# Patient Record
Sex: FEMALE | Race: WHITE | Employment: FULL TIME | URBAN - METROPOLITAN AREA
[De-identification: names, ages, dates, MRNs, and addresses within clinical notes are randomized per-mention and may not be internally consistent; named-entity substitution may affect disease eponyms.]

---

## 2017-05-30 ENCOUNTER — HOSPITAL ENCOUNTER (OUTPATIENT)
Age: 52
Discharge: HOME | End: 2017-05-30
Attending: EMERGENCY MEDICINE
Payer: COMMERCIAL

## 2017-05-30 VITALS
RESPIRATION RATE: 16 BRPM | SYSTOLIC BLOOD PRESSURE: 130 MMHG | OXYGEN SATURATION: 98 % | BODY MASS INDEX: 25.83 KG/M2 | HEIGHT: 65 IN | DIASTOLIC BLOOD PRESSURE: 85 MMHG | HEART RATE: 69 BPM | TEMPERATURE: 98 F | WEIGHT: 155 LBS

## 2017-05-30 DIAGNOSIS — J01.00 ACUTE NON-RECURRENT MAXILLARY SINUSITIS: Primary | ICD-10-CM

## 2017-05-30 PROCEDURE — 99204 OFFICE O/P NEW MOD 45 MIN: CPT

## 2017-05-30 PROCEDURE — 87430 STREP A AG IA: CPT

## 2017-05-30 PROCEDURE — 99203 OFFICE O/P NEW LOW 30 MIN: CPT

## 2017-05-30 RX ORDER — SULFAMETHOXAZOLE AND TRIMETHOPRIM 800; 160 MG/1; MG/1
1 TABLET ORAL 2 TIMES DAILY
Qty: 20 TABLET | Refills: 0 | Status: SHIPPED | OUTPATIENT
Start: 2017-05-30 | End: 2017-06-09

## 2017-05-30 RX ORDER — METOPROLOL SUCCINATE 25 MG/1
25 TABLET, EXTENDED RELEASE ORAL DAILY
COMMUNITY

## 2017-05-30 NOTE — ED PROVIDER NOTES
Patient Seen in: [address]    History   Patient presents with:  Sore Throat    Stated Complaint: Sore Throat    HPI  Patient is a 49-year-old female who presents to immediate care complaining of mild scratchy sore throa well-nourished. HENT:   Head: Normocephalic and atraumatic. Right Ear: External ear normal.   Left Ear: External ear normal.   Mouth/Throat: Oropharynx is clear and moist. No oropharyngeal exudate.    Eyes: Conjunctivae and EOM are normal. Pupils are eq

## 2017-05-30 NOTE — ED INITIAL ASSESSMENT (HPI)
Patient stats having sore throat, sinus pain/pressure, headaches and ear pressure, loss of appetite and intermittent nausea since Saturday. Patient denies fever, cough, vomiting.

## 2017-06-09 ENCOUNTER — HOSPITAL ENCOUNTER (OUTPATIENT)
Age: 52
Discharge: HOME | End: 2017-06-09
Attending: EMERGENCY MEDICINE
Payer: COMMERCIAL

## 2017-06-09 VITALS
HEIGHT: 65 IN | TEMPERATURE: 98 F | RESPIRATION RATE: 16 BRPM | DIASTOLIC BLOOD PRESSURE: 78 MMHG | OXYGEN SATURATION: 99 % | SYSTOLIC BLOOD PRESSURE: 120 MMHG | HEART RATE: 71 BPM | BODY MASS INDEX: 25.66 KG/M2 | WEIGHT: 154 LBS

## 2017-06-09 DIAGNOSIS — L27.0 DRUG RASH: Primary | ICD-10-CM

## 2017-06-09 PROCEDURE — 99213 OFFICE O/P EST LOW 20 MIN: CPT

## 2017-06-09 PROCEDURE — 99214 OFFICE O/P EST MOD 30 MIN: CPT

## 2017-06-09 PROCEDURE — 87430 STREP A AG IA: CPT

## 2017-06-09 RX ORDER — PREDNISONE 20 MG/1
40 TABLET ORAL DAILY
Qty: 6 TABLET | Refills: 0 | Status: SHIPPED | OUTPATIENT
Start: 2017-06-09 | End: 2017-06-12

## 2017-06-09 RX ORDER — PREDNISONE 20 MG/1
40 TABLET ORAL DAILY
Qty: 6 TABLET | Refills: 0 | Status: SHIPPED | OUTPATIENT
Start: 2017-06-09 | End: 2017-06-09

## 2017-06-09 NOTE — ED PROVIDER NOTES
Patient Seen in: [address]    History   Patient presents with:  Rash Skin Problem (integumentary)    Stated Complaint: congested, rash all over body    HPI    64-year-old female presents for evaluation of rash.   Patient appears well-developed and well-nourished. No distress. HENT:   Head: Normocephalic and atraumatic.    Mouth/Throat: Oropharynx is clear and moist.   No oral lesions or vesicles   Eyes: Conjunctivae are normal. Pupils are equal, round, and reactive to lig

## 2017-06-09 NOTE — ED INITIAL ASSESSMENT (HPI)
Pt presents to the IC with c/o rash to entire body and a fever s/p being on Bactrim for 10 days. AOx4. Pt notes nasal congestion that did not improve with the antibiotic. Pt took Zyrtec with minimal improvement.

## 2017-10-12 ENCOUNTER — HOSPITAL ENCOUNTER (OUTPATIENT)
Age: 52
Discharge: HOME | End: 2017-10-12
Attending: PEDIATRICS
Payer: COMMERCIAL

## 2017-10-12 VITALS
SYSTOLIC BLOOD PRESSURE: 137 MMHG | DIASTOLIC BLOOD PRESSURE: 92 MMHG | TEMPERATURE: 98 F | HEIGHT: 65 IN | OXYGEN SATURATION: 100 % | RESPIRATION RATE: 19 BRPM | WEIGHT: 130 LBS | BODY MASS INDEX: 21.66 KG/M2 | HEART RATE: 58 BPM

## 2017-10-12 DIAGNOSIS — J01.90 ACUTE SINUSITIS, RECURRENCE NOT SPECIFIED, UNSPECIFIED LOCATION: Primary | ICD-10-CM

## 2017-10-12 PROCEDURE — 99213 OFFICE O/P EST LOW 20 MIN: CPT

## 2017-10-12 RX ORDER — AMOXICILLIN AND CLAVULANATE POTASSIUM 875; 125 MG/1; MG/1
1 TABLET, FILM COATED ORAL 2 TIMES DAILY
Qty: 20 TABLET | Refills: 0 | Status: SHIPPED | OUTPATIENT
Start: 2017-10-12 | End: 2017-10-22

## 2017-10-12 NOTE — ED PROVIDER NOTES
Patient presents with:  Sinusitis      HPI:     Marychuy Lopez is a 45year old female who presents for evaluation of a chief complaint of upper respiratory symptoms for approximately 2 weeks. She denies fevers, chest pain, shortness of breath.   She notes c

## 2017-10-12 NOTE — ED INITIAL ASSESSMENT (HPI)
Sinus congestion. Pt attempted to use saline to clear sinuses but had green mucous and now unable to clear sinuses.    +discomfort behind eyes and left side of face

## 2020-01-03 ENCOUNTER — HOSPITAL ENCOUNTER (OUTPATIENT)
Age: 55
Discharge: HOME | End: 2020-01-03
Attending: EMERGENCY MEDICINE
Payer: COMMERCIAL

## 2020-01-03 VITALS
TEMPERATURE: 98 F | WEIGHT: 160 LBS | HEIGHT: 64 IN | SYSTOLIC BLOOD PRESSURE: 149 MMHG | HEART RATE: 64 BPM | RESPIRATION RATE: 16 BRPM | DIASTOLIC BLOOD PRESSURE: 87 MMHG | OXYGEN SATURATION: 100 % | BODY MASS INDEX: 27.31 KG/M2

## 2020-01-03 DIAGNOSIS — J06.9 UPPER RESPIRATORY TRACT INFECTION, UNSPECIFIED TYPE: Primary | ICD-10-CM

## 2020-01-03 PROCEDURE — 99213 OFFICE O/P EST LOW 20 MIN: CPT

## 2020-01-03 PROCEDURE — 99214 OFFICE O/P EST MOD 30 MIN: CPT

## 2020-01-03 RX ORDER — AZITHROMYCIN 250 MG/1
TABLET, FILM COATED ORAL
Qty: 1 PACKAGE | Refills: 0 | Status: SHIPPED | OUTPATIENT
Start: 2020-01-03 | End: 2020-01-08

## 2020-01-03 RX ORDER — NARATRIPTAN 2.5 MG/1
TABLET ORAL
COMMUNITY
Start: 2019-02-19

## 2020-01-03 RX ORDER — HYDROCHLOROTHIAZIDE 25 MG/1
TABLET ORAL
COMMUNITY
Start: 2019-10-14 | End: 2020-07-07

## 2020-01-03 NOTE — ED PROVIDER NOTES
Patient Seen in: [address]    History   Patient presents with:  Cough/URI    Stated Complaint: cold symptoms    HPI    Patient is here with complaint of cough congestion for about a week.   She is not really having a sor 27.46 kg/m²         Physical Exam  Constitutional:  Alert, well nourished adult lying in bed in no distress. Vital signs noted. Eye:  No scleral icterus. Eyelids appear normal, no lesions.   HEENT: Tympanic membranes are redness no bulging oropharynx is

## 2020-07-07 ENCOUNTER — OFFICE VISIT (OUTPATIENT)
Dept: INTEGRATIVE MEDICINE | Facility: CLINIC | Age: 55
End: 2020-07-07
Payer: COMMERCIAL

## 2020-07-07 VITALS
DIASTOLIC BLOOD PRESSURE: 70 MMHG | WEIGHT: 157.38 LBS | OXYGEN SATURATION: 98 % | BODY MASS INDEX: 26.87 KG/M2 | HEART RATE: 70 BPM | HEIGHT: 64 IN | SYSTOLIC BLOOD PRESSURE: 122 MMHG

## 2020-07-07 DIAGNOSIS — K90.41 GLUTEN INTOLERANCE: ICD-10-CM

## 2020-07-07 DIAGNOSIS — M81.8 AGE-RELATED OSTEOPOROSIS WITHOUT FRACTURE: Primary | ICD-10-CM

## 2020-07-07 DIAGNOSIS — I10 ESSENTIAL HYPERTENSION, BENIGN: ICD-10-CM

## 2020-07-07 DIAGNOSIS — G47.33 OSA (OBSTRUCTIVE SLEEP APNEA): ICD-10-CM

## 2020-07-07 PROBLEM — Z87.19 HISTORY OF ESOPHAGITIS: Status: ACTIVE | Noted: 2019-11-16

## 2020-07-07 PROBLEM — M54.2 CERVICALGIA: Status: ACTIVE | Noted: 2019-02-19

## 2020-07-07 PROCEDURE — 99204 OFFICE O/P NEW MOD 45 MIN: CPT | Performed by: FAMILY MEDICINE

## 2020-07-07 RX ORDER — AMLODIPINE BESYLATE 5 MG/1
5 TABLET ORAL DAILY
COMMUNITY
End: 2021-03-24

## 2020-07-07 RX ORDER — GALCANEZUMAB 120 MG/ML
INJECTION, SOLUTION SUBCUTANEOUS
COMMUNITY
Start: 2020-06-08

## 2020-07-07 NOTE — PROGRESS NOTES
Oksana Thomas is a 60year old female. Patient presents with: Integrative Medicine Consult: Questions about infusion injections for osteoporosis      HPI:     Broke her Rt wrist several years ago after falling.    Then cracked a rib while wrestling with Medication Sig Dispense Refill   • amLODIPine Besylate 5 MG Oral Tab Take 5 mg by mouth daily. • EMGALITY 120 MG/ML Subcutaneous Solution Auto-injector ADM 1 ML SC Q 30 DAYS     • Flurbiprofen 100 MG Oral Tab Take 100 mg by mouth as needed.      • Danuta SURGICAL HISTORY:   No past surgical history on file.     PHYSICAL EXAM:      07/07/20  0821   BP: 122/70   Pulse: 70   SpO2: 98%   Weight: 157 lb 6.4 oz (71.4 kg)   Height: 64\"       Physical Exam   Constitutional: She is oriented to person, place, and ti The products and items listed below (the “Products”)  and their claims have not been evaluated by the Food and Drug Administration. Dietary products are not intended to treat, prevent, mitigate or cure disease.  Ultimately, you must draw your own conclusion - Sit quietly before and during monitoring. When you're ready to take your blood pressure, sit for five minutes in a comfortable position with your legs and ankles uncrossed and your back supported against a chair.  Try to be calm and not think about stress Return in about 2 months (around 9/7/2020) for Integrative Medicine - Established (30 min). Patient affirmed understanding of plan and all questions were answered.      Ramiro Nash, DO

## 2020-07-07 NOTE — PATIENT INSTRUCTIONS
I have complete frankie in the body's ability to heal and transform. The products and items listed below (the “Products”)  and their claims have not been evaluated by the Food and Drug Administration.  Dietary products are not intended to treat, prevent, m increase blood pressure slightly. - Sit quietly before and during monitoring. When you're ready to take your blood pressure, sit for five minutes in a comfortable position with your legs and ankles uncrossed and your back supported against a chair.  Try to to Find: www. [employer]. com

## 2020-08-04 ENCOUNTER — OFFICE VISIT (OUTPATIENT)
Dept: SLEEP CENTER | Age: 55
End: 2020-08-04
Attending: FAMILY MEDICINE
Payer: COMMERCIAL

## 2020-08-04 DIAGNOSIS — G47.33 OSA (OBSTRUCTIVE SLEEP APNEA): Primary | ICD-10-CM

## 2020-08-04 PROCEDURE — 95806 SLEEP STUDY UNATT&RESP EFFT: CPT

## 2020-08-11 NOTE — PROCEDURES
[facility]  Accredited by the [place] of [employer] Medicine (AASM)    PATIENT'S NAME: Syeda Berman   ATTENDING PHYSICIAN: Tiffanie Daigle. DO Conner   REFERRING PHYSICIAN: [address] [place] [address]  DO Conner   PATIENT ACCOUNT #: [de-identified] LOCATION: Sleep 19:55:18  t: 08/10/2020 20:58:08  [facility] [id number]  JDP/    cc: Thomas Zavala.  DO Conner

## 2020-09-09 ENCOUNTER — OFFICE VISIT (OUTPATIENT)
Dept: INTEGRATIVE MEDICINE | Facility: CLINIC | Age: 55
End: 2020-09-09
Payer: COMMERCIAL

## 2020-09-09 VITALS
OXYGEN SATURATION: 99 % | BODY MASS INDEX: 26.8 KG/M2 | SYSTOLIC BLOOD PRESSURE: 132 MMHG | HEART RATE: 64 BPM | HEIGHT: 64 IN | DIASTOLIC BLOOD PRESSURE: 86 MMHG | WEIGHT: 157 LBS

## 2020-09-09 DIAGNOSIS — M81.8 AGE-RELATED OSTEOPOROSIS WITHOUT FRACTURE: Primary | ICD-10-CM

## 2020-09-09 DIAGNOSIS — I10 ESSENTIAL HYPERTENSION, BENIGN: ICD-10-CM

## 2020-09-09 DIAGNOSIS — E66.3 OVERWEIGHT (BMI 25.0-29.9): ICD-10-CM

## 2020-09-09 DIAGNOSIS — G47.33 OSA (OBSTRUCTIVE SLEEP APNEA): ICD-10-CM

## 2020-09-09 DIAGNOSIS — K90.41 GLUTEN INTOLERANCE: ICD-10-CM

## 2020-09-09 DIAGNOSIS — G47.10 HYPERSOMNIA: ICD-10-CM

## 2020-09-09 PROCEDURE — 3079F DIAST BP 80-89 MM HG: CPT | Performed by: FAMILY MEDICINE

## 2020-09-09 PROCEDURE — 99214 OFFICE O/P EST MOD 30 MIN: CPT | Performed by: FAMILY MEDICINE

## 2020-09-09 PROCEDURE — 3008F BODY MASS INDEX DOCD: CPT | Performed by: FAMILY MEDICINE

## 2020-09-09 PROCEDURE — 3075F SYST BP GE 130 - 139MM HG: CPT | Performed by: FAMILY MEDICINE

## 2020-09-09 NOTE — PATIENT INSTRUCTIONS
I have complete frankie in the body's ability to heal and transform. The products and items listed below (the “Products”)  and their claims have not been evaluated by the Food and Drug Administration.  Dietary products are not intended to treat, prevent, m A liquid supplement for gut health. This is a carbon, soil-based product that helps to rebuild the tight junctions, or important connections between cells, in the intestines.  These tight junctions get compromised by daily stress, reduced immune function an

## 2020-09-09 NOTE — PROGRESS NOTES
Chrissie Alford is a 60year old female. Patient presents with: Integrative Medicine Consult: 2 mos      HPI:     Working on diet and exercise. Went gluten free and then cheated last week and got tired for several days from this.    Feeling better after Marital status:       Spouse name: Not on file      Number of children: Not on file      Years of education: Not on file      Highest education level: Not on file    Occupational History      Not on file    Social Needs      Financial resource s Musculoskeletal:         General: No edema. Neurological: She is alert and oriented to person, place, and time. No cranial nerve deficit. Gait normal.   Skin: Skin is warm and dry.    Psychiatric: Affect normal.       ASSESSMENT AND PLAN:       1. Age-rel The products and items listed below (the “Products”)  and their claims have not been evaluated by the Food and Drug Administration. Dietary products are not intended to treat, prevent, mitigate or cure disease.  Ultimately, you must draw your own conclusion A liquid supplement for gut health. This is a carbon, soil-based product that helps to rebuild the tight junctions, or important connections between cells, in the intestines.  These tight junctions get compromised by daily stress, reduced immune function an

## 2020-10-15 ENCOUNTER — OFFICE VISIT (OUTPATIENT)
Dept: SLEEP CENTER | Age: 55
End: 2020-10-15
Attending: FAMILY MEDICINE
Payer: COMMERCIAL

## 2020-10-15 DIAGNOSIS — Z76.89 SLEEP CONCERN: Primary | ICD-10-CM

## 2020-10-15 DIAGNOSIS — M81.8 AGE-RELATED OSTEOPOROSIS WITHOUT FRACTURE: ICD-10-CM

## 2020-10-15 DIAGNOSIS — G47.33 OSA (OBSTRUCTIVE SLEEP APNEA): ICD-10-CM

## 2020-10-15 DIAGNOSIS — G47.10 HYPERSOMNIA: ICD-10-CM

## 2020-10-15 DIAGNOSIS — E66.3 OVERWEIGHT (BMI 25.0-29.9): ICD-10-CM

## 2020-10-15 DIAGNOSIS — I10 ESSENTIAL HYPERTENSION, BENIGN: ICD-10-CM

## 2020-10-15 PROCEDURE — 95810 POLYSOM 6/> YRS 4/> PARAM: CPT

## 2020-10-25 NOTE — PROCEDURES
[address] [facility]  Accredited by the [employer] of Sleep Medicine (AASM)    PATIENT'S NAME: Marshal Cardona   ATTENDING PHYSICIAN: Davon Hickman. DO Conner   REFERRING PHYSICIAN: [address] [place] [address]  DO Conner   PATIENT ACCOUNT #: [de-identified] LOCATION: Sleep Periodic limb movements were not observed. EEG:  With the limited montage recorded, no EEG abnormalities were observed. IMPRESSION:  This study demonstrates primary snoring.   Sleep-disordered breathing was observed but it was most prominent during s

## 2020-11-14 ENCOUNTER — HOSPITAL ENCOUNTER (OUTPATIENT)
Age: 55
Discharge: HOME | End: 2020-11-14
Payer: COMMERCIAL

## 2020-11-14 VITALS
TEMPERATURE: 98 F | RESPIRATION RATE: 16 BRPM | BODY MASS INDEX: 26.46 KG/M2 | OXYGEN SATURATION: 98 % | DIASTOLIC BLOOD PRESSURE: 81 MMHG | SYSTOLIC BLOOD PRESSURE: 122 MMHG | WEIGHT: 155 LBS | HEIGHT: 64 IN | HEART RATE: 67 BPM

## 2020-11-14 DIAGNOSIS — G44.89 OTHER HEADACHE SYNDROME: ICD-10-CM

## 2020-11-14 DIAGNOSIS — Z20.822 ENCOUNTER FOR SCREENING LABORATORY TESTING FOR COVID-19 VIRUS: Primary | ICD-10-CM

## 2020-11-14 DIAGNOSIS — R09.81 CONGESTION OF NASAL SINUS: ICD-10-CM

## 2020-11-14 PROCEDURE — 99202 OFFICE O/P NEW SF 15 MIN: CPT | Performed by: EMERGENCY MEDICINE

## 2020-11-14 NOTE — ED PROVIDER NOTES
Patient Seen in: [facility]      History   Patient presents with:  Testing  Headache    Stated Complaint: cris Linn is a 60year old  female here for Covid testing after symptoms of headache, and nasal congestion.  Medical Normocephalic. Right Ear: External ear normal.      Left Ear: External ear normal.      Nose: Congestion present. Mouth/Throat:      Mouth: Mucous membranes are moist.      Pharynx: No oropharyngeal exudate.    Eyes:      Conjunctiva/sclera: Conju and plan, who verbalized understanding and agreement with the plan. All questions answered. Any uncontrolled Sob, uncontrolled fever, or CP go to ER for further eval.  Otherwise treat your symptoms at home.     I have given the patient instructions regardin

## 2020-11-14 NOTE — ED INITIAL ASSESSMENT (HPI)
Pt c/o headache and stuffy nose x3 days. No fever. No known covid exposure. Awake/alert. Breathing easy and even without distress. Speech clear. Skin warm, dry and pink.

## 2020-12-09 ENCOUNTER — OFFICE VISIT (OUTPATIENT)
Dept: INTEGRATIVE MEDICINE | Facility: CLINIC | Age: 55
End: 2020-12-09
Payer: COMMERCIAL

## 2020-12-09 VITALS
WEIGHT: 159 LBS | HEART RATE: 58 BPM | HEIGHT: 64 IN | BODY MASS INDEX: 27.14 KG/M2 | DIASTOLIC BLOOD PRESSURE: 86 MMHG | OXYGEN SATURATION: 98 % | SYSTOLIC BLOOD PRESSURE: 116 MMHG

## 2020-12-09 DIAGNOSIS — K90.41 GLUTEN INTOLERANCE: ICD-10-CM

## 2020-12-09 DIAGNOSIS — E66.3 OVERWEIGHT (BMI 25.0-29.9): ICD-10-CM

## 2020-12-09 DIAGNOSIS — I10 ESSENTIAL HYPERTENSION, BENIGN: ICD-10-CM

## 2020-12-09 DIAGNOSIS — G47.33 OSA (OBSTRUCTIVE SLEEP APNEA): ICD-10-CM

## 2020-12-09 DIAGNOSIS — Z00.00 ROUTINE MEDICAL EXAM: Primary | ICD-10-CM

## 2020-12-09 DIAGNOSIS — M81.8 AGE-RELATED OSTEOPOROSIS WITHOUT FRACTURE: ICD-10-CM

## 2020-12-09 PROBLEM — R20.0 LEFT ARM NUMBNESS: Status: ACTIVE | Noted: 2019-02-19

## 2020-12-09 PROBLEM — H83.2X9 VESTIBULAR DYSFUNCTION: Status: ACTIVE | Noted: 2019-02-19

## 2020-12-09 PROBLEM — Z12.11 SPECIAL SCREENING FOR MALIGNANT NEOPLASMS, COLON: Status: ACTIVE | Noted: 2018-12-27

## 2020-12-09 PROBLEM — H81.90 VESTIBULAR DYSFUNCTION: Status: ACTIVE | Noted: 2019-02-19

## 2020-12-09 PROBLEM — G43.109 MIGRAINE WITH AURA AND WITHOUT STATUS MIGRAINOSUS, NOT INTRACTABLE: Status: ACTIVE | Noted: 2019-02-19

## 2020-12-09 PROCEDURE — 99396 PREV VISIT EST AGE 40-64: CPT | Performed by: FAMILY MEDICINE

## 2020-12-09 PROCEDURE — 90686 IIV4 VACC NO PRSV 0.5 ML IM: CPT | Performed by: FAMILY MEDICINE

## 2020-12-09 PROCEDURE — 90471 IMMUNIZATION ADMIN: CPT | Performed by: FAMILY MEDICINE

## 2020-12-09 PROCEDURE — 3008F BODY MASS INDEX DOCD: CPT | Performed by: FAMILY MEDICINE

## 2020-12-09 PROCEDURE — 3074F SYST BP LT 130 MM HG: CPT | Performed by: FAMILY MEDICINE

## 2020-12-09 PROCEDURE — 3079F DIAST BP 80-89 MM HG: CPT | Performed by: FAMILY MEDICINE

## 2020-12-09 NOTE — PATIENT INSTRUCTIONS
I have complete frankie in the body's ability to heal and transform. The products and items listed below (the “Products”)  and their claims have not been evaluated by the Food and Drug Administration.  Dietary products are not intended to treat, prevent, m - Don't measure your blood pressure right after you wake up. You can prepare for the day, but don't eat breakfast or take medications before measuring your blood pressure.  If you exercise after waking, take your blood pressure before exercising.  - Avoid f

## 2020-12-09 NOTE — PROGRESS NOTES
Leonard Liu is a 60year old female. Patient presents with:  Physical      HPI:     Cut out most sugar, using coconut sugar instead. Avoiding fried foods. By limiting her water she is waking up less in the night to urinate.    Has been on BP meds SOCIAL HISTORY:   Social History    Socioeconomic History      Marital status:       Spouse name: Not on file      Number of children: Not on file      Years of education: Not on file      Highest education level: Not on file    Occupational History Nose: Nose normal.   Mouth/Throat: Oropharynx is clear and moist. No oropharyngeal exudate. Eyes: Pupils are equal, round, and reactive to light. Conjunctivae and EOM are normal. No scleral icterus. Neck: Normal range of motion. Neck supple.  No thyrome Vitamin D, 25-Hydroxy [E]      TSH W Reflex To Free T4 [E]      Magnesium [E]      Lipid Panel [E]      Hemoglobin A1C (Glycohemoglobin) [E]      Homocysteine [E]      Sed Rate, Westergren (Automated) [E]      C-RP/High Sensitivity [E]      Flulaval 6 Home Blood Pressure Monitoring  Please purchase a home blood pressure cuff (Omron is a good brand). Keep a daily log of your blood pressure. Take your blood pressure throughout the day.   Goal blood pressure is <140/90    Tips  - Don't measure your blood · Healthy diet including adequate intake of vegetables and fruits, appropriate portion sizes, minimizing highly concentrated carbohydrate foods      Patient affirmed understanding of plan and all questions were answered.      Naif Carvalho, DO

## 2020-12-11 ENCOUNTER — LAB ENCOUNTER (OUTPATIENT)
Dept: LAB | Facility: REFERENCE LAB | Age: 55
End: 2020-12-11
Attending: FAMILY MEDICINE
Payer: COMMERCIAL

## 2020-12-11 DIAGNOSIS — Z00.00 ROUTINE MEDICAL EXAM: ICD-10-CM

## 2020-12-11 PROCEDURE — 82607 VITAMIN B-12: CPT

## 2020-12-11 PROCEDURE — 85025 COMPLETE CBC W/AUTO DIFF WBC: CPT

## 2020-12-11 PROCEDURE — 84443 ASSAY THYROID STIM HORMONE: CPT

## 2020-12-11 PROCEDURE — 86628 CANDIDA ANTIBODY: CPT

## 2020-12-11 PROCEDURE — 83036 HEMOGLOBIN GLYCOSYLATED A1C: CPT

## 2020-12-11 PROCEDURE — 83090 ASSAY OF HOMOCYSTEINE: CPT

## 2020-12-11 PROCEDURE — 36415 COLL VENOUS BLD VENIPUNCTURE: CPT

## 2020-12-11 PROCEDURE — 85652 RBC SED RATE AUTOMATED: CPT

## 2020-12-11 PROCEDURE — 82306 VITAMIN D 25 HYDROXY: CPT

## 2020-12-11 PROCEDURE — 86141 C-REACTIVE PROTEIN HS: CPT

## 2020-12-11 PROCEDURE — 80061 LIPID PANEL: CPT

## 2020-12-11 PROCEDURE — 83735 ASSAY OF MAGNESIUM: CPT

## 2020-12-11 PROCEDURE — 84207 ASSAY OF VITAMIN B-6: CPT

## 2020-12-11 PROCEDURE — 80053 COMPREHEN METABOLIC PANEL: CPT

## 2020-12-28 ENCOUNTER — OFFICE VISIT (OUTPATIENT)
Dept: INTEGRATIVE MEDICINE | Facility: CLINIC | Age: 55
End: 2020-12-28
Payer: COMMERCIAL

## 2020-12-28 DIAGNOSIS — E66.3 OVERWEIGHT (BMI 25.0-29.9): ICD-10-CM

## 2020-12-28 PROCEDURE — 97802 MEDICAL NUTRITION INDIV IN: CPT | Performed by: NUTRITIONIST

## 2020-12-28 NOTE — PROGRESS NOTES
Patient referred by Luzmaria Grant  Did patient complete Nutritional Therapy Questionnaire? NO  Email: [email or web address]    Matthew Negro is a 60year old female presenting for medical nutrition therapy in regards to candidiasis and osteopenia.   Dexa sc supplement regime    Follow up PRN    Time spent with patient: 60 minutes    Dietary Supplements:   See patient instructions    KAE Parks  12/28/2020  3:59 PM

## 2021-03-24 ENCOUNTER — OFFICE VISIT (OUTPATIENT)
Dept: INTEGRATIVE MEDICINE | Facility: CLINIC | Age: 56
End: 2021-03-24
Payer: COMMERCIAL

## 2021-03-24 VITALS
WEIGHT: 155.19 LBS | BODY MASS INDEX: 26.49 KG/M2 | HEART RATE: 62 BPM | SYSTOLIC BLOOD PRESSURE: 122 MMHG | DIASTOLIC BLOOD PRESSURE: 82 MMHG | OXYGEN SATURATION: 97 % | HEIGHT: 64 IN

## 2021-03-24 DIAGNOSIS — I10 ESSENTIAL HYPERTENSION, BENIGN: Primary | ICD-10-CM

## 2021-03-24 DIAGNOSIS — K90.41 GLUTEN INTOLERANCE: ICD-10-CM

## 2021-03-24 DIAGNOSIS — M81.8 AGE-RELATED OSTEOPOROSIS WITHOUT FRACTURE: ICD-10-CM

## 2021-03-24 DIAGNOSIS — B37.9 CANDIDIASIS: ICD-10-CM

## 2021-03-24 DIAGNOSIS — E66.3 OVERWEIGHT (BMI 25.0-29.9): ICD-10-CM

## 2021-03-24 DIAGNOSIS — G43.109 MIGRAINE WITH AURA AND WITHOUT STATUS MIGRAINOSUS, NOT INTRACTABLE: ICD-10-CM

## 2021-03-24 PROBLEM — G47.30 SLEEP DISORDER BREATHING: Status: ACTIVE | Noted: 2020-12-16

## 2021-03-24 PROCEDURE — 3008F BODY MASS INDEX DOCD: CPT | Performed by: FAMILY MEDICINE

## 2021-03-24 PROCEDURE — 99214 OFFICE O/P EST MOD 30 MIN: CPT | Performed by: FAMILY MEDICINE

## 2021-03-24 PROCEDURE — 3074F SYST BP LT 130 MM HG: CPT | Performed by: FAMILY MEDICINE

## 2021-03-24 PROCEDURE — 3079F DIAST BP 80-89 MM HG: CPT | Performed by: FAMILY MEDICINE

## 2021-03-24 RX ORDER — AMLODIPINE BESYLATE 5 MG/1
2.5 TABLET ORAL DAILY
Refills: 0 | COMMUNITY
Start: 2021-03-24

## 2021-03-24 NOTE — PROGRESS NOTES
Yves Abraham is a 60year old female. Patient presents with: Integrative Medicine Consult: 3 mo f/u      HPI:     Feeling great. Started intermittent fasting up to 16 hrs. Stops eating at 6pm or before. Has protein shake in the morning.    BP at Highest education level: Not on file    Occupational History      Not on file    Tobacco Use      Smoking status: Never Smoker      Smokeless tobacco: Never Used    Vaping Use      Vaping Use: Never used    Substance and Sexual Activity      Alcohol use: person, place, and time. Cranial Nerves: No cranial nerve deficit. Gait: Gait is intact.    Psychiatric:         Mood and Affect: Affect normal.         ASSESSMENT AND PLAN:     [facility] Lab Encounter on 12/11/2020   Component Date Value Ref Range Stat who is consuming high dose of biotin (a.k.a., vitamin B7, vitamin H, coenzyme R) supplements resulting in serum concentrations >100 ng/mL.   Intake of the recommended daily allowance (RDA) for biotin (0.03 mg) has not been shown to typically cause significa Association. eAG levels reflect the long term average glucose and may not correlate with random or fasting glucose levels since these represent specific points in time.           • Homocysteine 12/11/2020 8.1  3.2 - 10.7 umol/L Final   • Sed Rate 12/11/2020 greater: . ... Positive - Antibody to Candida                              albicans detected, which may                              indicate a current or past                              infection.     The best evidence for current infection is a significa Pyridoxal 5'-phosphate measured in a specimen collected following   an 8-hour or overnight fast accurately indicates vitamin B6   nutritional status. Non-fasting specimen concentration reflects   recent vitamin intake.     Test developed and characteristics 12/11/2020 1.66  1.00 - 4.00 x10(3) uL Final   • Monocyte Absolute 12/11/2020 0.51  0.10 - 1.00 x10(3) uL Final   • Eosinophil Absolute 12/11/2020 0.14  0.00 - 0.70 x10(3) uL Final   • Basophil Absolute 12/11/2020 0.03  0.00 - 0.20 x10(3) uL Final   • [facility] is receiving a high volume of samples from  a wide variety of swabs and media for COVID-19 testing. In order to serve patients during this public health  crisis, samples from appropriate clinical sources are   being tested.  Negative test resul own conclusion as to the efficacy of the Product and immediately stop use of the Products if a negative reaction should arise.   You should always consult a licensed health care professional before starting any supplement, dietary, or exercise program, [place]

## 2021-03-24 NOTE — PATIENT INSTRUCTIONS
I have complete frankie in the body's ability to heal and transform. The products and items listed below (the “Products”)  and their claims have not been evaluated by the Food and Drug Administration.  Dietary products are not intended to treat, prevent, m

## 2021-07-01 ENCOUNTER — HOSPITAL ENCOUNTER (OUTPATIENT)
Age: 56
Discharge: HOME | End: 2021-07-01
Payer: COMMERCIAL

## 2021-07-01 VITALS
DIASTOLIC BLOOD PRESSURE: 88 MMHG | BODY MASS INDEX: 25.61 KG/M2 | TEMPERATURE: 97 F | SYSTOLIC BLOOD PRESSURE: 148 MMHG | HEART RATE: 56 BPM | OXYGEN SATURATION: 100 % | RESPIRATION RATE: 16 BRPM | WEIGHT: 150 LBS | HEIGHT: 64 IN

## 2021-07-01 DIAGNOSIS — J01.90 ACUTE SINUSITIS, RECURRENCE NOT SPECIFIED, UNSPECIFIED LOCATION: Primary | ICD-10-CM

## 2021-07-01 PROCEDURE — 99213 OFFICE O/P EST LOW 20 MIN: CPT | Performed by: NURSE PRACTITIONER

## 2021-07-01 RX ORDER — AMOXICILLIN AND CLAVULANATE POTASSIUM 875; 125 MG/1; MG/1
1 TABLET, FILM COATED ORAL 2 TIMES DAILY
Qty: 20 TABLET | Refills: 0 | Status: SHIPPED | OUTPATIENT
Start: 2021-07-01 | End: 2021-07-11

## 2021-07-01 RX ORDER — ALBUTEROL SULFATE 90 UG/1
2 AEROSOL, METERED RESPIRATORY (INHALATION) EVERY 4 HOURS PRN
Qty: 6.7 G | Refills: 0 | Status: SHIPPED | OUTPATIENT
Start: 2021-07-01

## 2021-07-01 NOTE — ED PROVIDER NOTES
Patient Seen in: [facility]      History   Patient presents with:  Cough/URI  Sore Throat    Stated Complaint: cold symptoms    HPI/Subjective:         15-year-old female, with history of hypertension and migraines, presents to the immediate tenderness present. Cardiovascular:      Rate and Rhythm: Normal rate and regular rhythm. Pulses: Normal pulses. Heart sounds: Normal heart sounds. Pulmonary:      Effort: Pulmonary effort is normal.      Breath sounds: Normal breath sounds. (90 Base) MCG/ACT Inhalation Aero Soln  Inhale 2 puffs into the lungs every 4 (four) hours as needed for Wheezing. Qty: 6.7 g Refills: 0    Amoxicillin-Pot Clavulanate 875-125 MG Oral Tab  Take 1 tablet by mouth 2 (two) times daily for 10 days.   Qty: 20 t

## 2021-07-01 NOTE — ED INITIAL ASSESSMENT (HPI)
Pt c/o cold symptoms , initially had runny nose, sore throat started 6/28/21pt now c/o cough congestion feels like\"bronchitis\"

## 2022-04-20 ENCOUNTER — HOSPITAL ENCOUNTER (OUTPATIENT)
Age: 57
Discharge: HOME | End: 2022-04-20
Payer: COMMERCIAL

## 2022-04-20 VITALS
SYSTOLIC BLOOD PRESSURE: 137 MMHG | HEIGHT: 64 IN | HEART RATE: 70 BPM | WEIGHT: 158 LBS | OXYGEN SATURATION: 98 % | RESPIRATION RATE: 16 BRPM | BODY MASS INDEX: 26.98 KG/M2 | DIASTOLIC BLOOD PRESSURE: 83 MMHG | TEMPERATURE: 98 F

## 2022-04-20 DIAGNOSIS — J01.90 ACUTE NON-RECURRENT SINUSITIS, UNSPECIFIED LOCATION: Primary | ICD-10-CM

## 2022-04-20 DIAGNOSIS — Z86.16 HISTORY OF COVID-19: ICD-10-CM

## 2022-04-20 PROCEDURE — 99213 OFFICE O/P EST LOW 20 MIN: CPT | Performed by: NURSE PRACTITIONER

## 2022-04-20 RX ORDER — AMOXICILLIN AND CLAVULANATE POTASSIUM 875; 125 MG/1; MG/1
1 TABLET, FILM COATED ORAL 2 TIMES DAILY
Qty: 20 TABLET | Refills: 0 | Status: SHIPPED | OUTPATIENT
Start: 2022-04-20 | End: 2022-04-30

## 2022-05-02 ENCOUNTER — OFFICE VISIT (OUTPATIENT)
Dept: FAMILY MEDICINE CLINIC | Facility: CLINIC | Age: 57
End: 2022-05-02
Payer: COMMERCIAL

## 2022-05-02 VITALS
HEART RATE: 56 BPM | BODY MASS INDEX: 28 KG/M2 | WEIGHT: 164 LBS | HEIGHT: 64 IN | DIASTOLIC BLOOD PRESSURE: 90 MMHG | OXYGEN SATURATION: 100 % | RESPIRATION RATE: 16 BRPM | SYSTOLIC BLOOD PRESSURE: 124 MMHG

## 2022-05-02 DIAGNOSIS — R09.81 NASAL CONGESTION: ICD-10-CM

## 2022-05-02 DIAGNOSIS — J06.9 VIRAL URI: Primary | ICD-10-CM

## 2022-05-02 DIAGNOSIS — R05.9 COUGH: ICD-10-CM

## 2022-05-02 PROCEDURE — 3008F BODY MASS INDEX DOCD: CPT | Performed by: NURSE PRACTITIONER

## 2022-05-02 PROCEDURE — 3074F SYST BP LT 130 MM HG: CPT | Performed by: NURSE PRACTITIONER

## 2022-05-02 PROCEDURE — 99214 OFFICE O/P EST MOD 30 MIN: CPT | Performed by: NURSE PRACTITIONER

## 2022-05-02 PROCEDURE — 3080F DIAST BP >= 90 MM HG: CPT | Performed by: NURSE PRACTITIONER

## 2022-06-01 ENCOUNTER — HOSPITAL ENCOUNTER (OUTPATIENT)
Age: 57
Discharge: HOME | End: 2022-06-01
Payer: COMMERCIAL

## 2022-06-01 ENCOUNTER — APPOINTMENT (OUTPATIENT)
Dept: GENERAL RADIOLOGY | Age: 57
End: 2022-06-01
Attending: NURSE PRACTITIONER
Payer: COMMERCIAL

## 2022-06-01 VITALS
DIASTOLIC BLOOD PRESSURE: 88 MMHG | TEMPERATURE: 98 F | SYSTOLIC BLOOD PRESSURE: 132 MMHG | OXYGEN SATURATION: 99 % | RESPIRATION RATE: 16 BRPM | HEIGHT: 64 IN | BODY MASS INDEX: 27.31 KG/M2 | HEART RATE: 60 BPM | WEIGHT: 160 LBS

## 2022-06-01 DIAGNOSIS — R07.81 RIB PAIN ON RIGHT SIDE: Primary | ICD-10-CM

## 2022-06-01 PROCEDURE — 71101 X-RAY EXAM UNILAT RIBS/CHEST: CPT | Performed by: NURSE PRACTITIONER

## 2022-06-01 PROCEDURE — 99213 OFFICE O/P EST LOW 20 MIN: CPT | Performed by: NURSE PRACTITIONER

## 2022-06-01 NOTE — ED INITIAL ASSESSMENT (HPI)
Patient here for evaluation of a possible pulled muscle or broke rib to the right mid back and radiates to the right chest. States it occurred 1 week ago when she was in pilates and using a glider during a workout. States she continued working out 3 more days and it continued to get worse. She took ibuprofen 3 days ago and it was helpful with the pain but is here today for imaging to make sure she didn't break a rib. She has no bruising or swelling present.

## 2023-02-21 ENCOUNTER — OFFICE VISIT (OUTPATIENT)
Dept: PODIATRY CLINIC | Facility: CLINIC | Age: 58
End: 2023-02-21

## 2023-02-21 ENCOUNTER — HOSPITAL ENCOUNTER (OUTPATIENT)
Dept: GENERAL RADIOLOGY | Facility: HOSPITAL | Age: 58
Discharge: HOME | End: 2023-02-21
Attending: STUDENT IN AN ORGANIZED HEALTH CARE EDUCATION/TRAINING PROGRAM
Payer: COMMERCIAL

## 2023-02-21 DIAGNOSIS — M84.375A STRESS FRACTURE OF METATARSAL BONE OF LEFT FOOT, INITIAL ENCOUNTER: Primary | ICD-10-CM

## 2023-02-21 DIAGNOSIS — M84.375A STRESS FRACTURE OF METATARSAL BONE OF LEFT FOOT, INITIAL ENCOUNTER: ICD-10-CM

## 2023-02-21 PROCEDURE — 99203 OFFICE O/P NEW LOW 30 MIN: CPT | Performed by: STUDENT IN AN ORGANIZED HEALTH CARE EDUCATION/TRAINING PROGRAM

## 2023-02-21 PROCEDURE — L3260 AMBULATORY SURGICAL BOOT EAC: HCPCS | Performed by: STUDENT IN AN ORGANIZED HEALTH CARE EDUCATION/TRAINING PROGRAM

## 2023-02-21 PROCEDURE — 73630 X-RAY EXAM OF FOOT: CPT | Performed by: STUDENT IN AN ORGANIZED HEALTH CARE EDUCATION/TRAINING PROGRAM

## 2023-03-14 ENCOUNTER — OFFICE VISIT (OUTPATIENT)
Dept: PODIATRY CLINIC | Facility: CLINIC | Age: 58
End: 2023-03-14

## 2023-03-14 ENCOUNTER — HOSPITAL ENCOUNTER (OUTPATIENT)
Dept: GENERAL RADIOLOGY | Facility: HOSPITAL | Age: 58
Discharge: HOME | End: 2023-03-14
Attending: STUDENT IN AN ORGANIZED HEALTH CARE EDUCATION/TRAINING PROGRAM
Payer: COMMERCIAL

## 2023-03-14 DIAGNOSIS — Z47.89 ORTHOPEDIC AFTERCARE: ICD-10-CM

## 2023-03-14 DIAGNOSIS — G57.62 PLANTAR NEUROMA OF LEFT FOOT: Primary | ICD-10-CM

## 2023-03-14 PROCEDURE — 99213 OFFICE O/P EST LOW 20 MIN: CPT | Performed by: STUDENT IN AN ORGANIZED HEALTH CARE EDUCATION/TRAINING PROGRAM

## 2023-03-14 PROCEDURE — 73630 X-RAY EXAM OF FOOT: CPT | Performed by: STUDENT IN AN ORGANIZED HEALTH CARE EDUCATION/TRAINING PROGRAM

## 2023-03-14 RX ORDER — MAGNESIUM 200 MG
TABLET ORAL
COMMUNITY

## 2023-12-29 ENCOUNTER — OFFICE VISIT (OUTPATIENT)
Dept: FAMILY MEDICINE CLINIC | Facility: CLINIC | Age: 58
End: 2023-12-29
Payer: COMMERCIAL

## 2023-12-29 VITALS
HEART RATE: 91 BPM | BODY MASS INDEX: 29.13 KG/M2 | OXYGEN SATURATION: 97 % | DIASTOLIC BLOOD PRESSURE: 80 MMHG | SYSTOLIC BLOOD PRESSURE: 126 MMHG | HEIGHT: 64 IN | WEIGHT: 170.63 LBS

## 2023-12-29 DIAGNOSIS — I10 PRIMARY HYPERTENSION: ICD-10-CM

## 2023-12-29 DIAGNOSIS — E78.00 HYPERCHOLESTEROLEMIA: ICD-10-CM

## 2023-12-29 DIAGNOSIS — R42 VERTIGO: ICD-10-CM

## 2023-12-29 DIAGNOSIS — Z82.49 FAMILY HISTORY OF HEART ATTACK: ICD-10-CM

## 2023-12-29 DIAGNOSIS — K21.9 GASTROESOPHAGEAL REFLUX DISEASE WITHOUT ESOPHAGITIS: Primary | ICD-10-CM

## 2023-12-29 DIAGNOSIS — M81.0 AGE-RELATED OSTEOPOROSIS WITHOUT CURRENT PATHOLOGICAL FRACTURE: ICD-10-CM

## 2023-12-29 PROBLEM — G54.2 CERVICAL NERVE ROOT IMPINGEMENT: Status: ACTIVE | Noted: 2019-02-19

## 2023-12-29 PROBLEM — D23.5 DYSPLASTIC NEVUS OF TRUNK: Status: ACTIVE | Noted: 2023-07-22

## 2023-12-29 PROBLEM — M53.3 COCCYGEAL PAIN: Status: ACTIVE | Noted: 2023-08-03

## 2023-12-29 PROCEDURE — 99214 OFFICE O/P EST MOD 30 MIN: CPT | Performed by: STUDENT IN AN ORGANIZED HEALTH CARE EDUCATION/TRAINING PROGRAM

## 2023-12-29 PROCEDURE — 3074F SYST BP LT 130 MM HG: CPT | Performed by: STUDENT IN AN ORGANIZED HEALTH CARE EDUCATION/TRAINING PROGRAM

## 2023-12-29 PROCEDURE — 3079F DIAST BP 80-89 MM HG: CPT | Performed by: STUDENT IN AN ORGANIZED HEALTH CARE EDUCATION/TRAINING PROGRAM

## 2023-12-29 PROCEDURE — 3008F BODY MASS INDEX DOCD: CPT | Performed by: STUDENT IN AN ORGANIZED HEALTH CARE EDUCATION/TRAINING PROGRAM

## 2023-12-29 RX ORDER — PANTOPRAZOLE SODIUM 40 MG/1
40 TABLET, DELAYED RELEASE ORAL DAILY
COMMUNITY
Start: 2023-10-30

## 2024-01-11 ENCOUNTER — HOSPITAL ENCOUNTER (OUTPATIENT)
Dept: CT IMAGING | Age: 59
Discharge: HOME | End: 2024-01-11
Attending: STUDENT IN AN ORGANIZED HEALTH CARE EDUCATION/TRAINING PROGRAM

## 2024-01-11 DIAGNOSIS — Z82.49 FAMILY HISTORY OF HEART ATTACK: ICD-10-CM

## 2024-01-11 DIAGNOSIS — E78.00 HYPERCHOLESTEROLEMIA: ICD-10-CM

## 2024-01-11 LAB
POCT GLUCOSE CHOLESTECH: 84 (ref 70–99)
POCT HDL: 43 (ref 55–75)
POCT LDL: 141 (ref 0–99)
POCT TOTAL CHOLESTEROL: 224 (ref 110–200)
POCT TRIGLYCERIDES: 197 (ref 1–149)

## 2024-01-11 NOTE — PROGRESS NOTES
Date of Service 2024    HARMAN BAILEY  Date of Birth 1965    Patient Age: 58 year old    PCP: Miguel Juares MD  [address] [place] [id number]    Heart Scan Consult  Preliminary Heart Scan Score: 0    Previous Screening  Heart Scan Completed Previously: No        Peripheral Vascular Scan Completed Previously: No          Risk Factors  Personal Risk Factors  Non-alterable Risk Factors: Family History (Father  from [place] at 44 yo.)  Alterable Risk Factors: High Blood Pressure;Abnormal Cholesterol      Body Mass Index  There is no height or weight on file to calculate BMI.    Blood Pressure  /80  (Normal =< 120/80,  Elevated = 120-129/ >80,  High Stage1 130-139/80-89 , Stage2 >140/>90)    Lipid Profile  Patient was in fasting state: No    Cholesterol: 224, done on 2024.  HDL Cholesterol: 43, done on 2024.  LDL Cholesterol: 141, done on 2024.  TriGlycerides 197, done on 2024.    She is not on cholesterol med.    Cholesterol Goals  Value   Total  =< 200   HDL  = > 45 Men = > 55 Women   LDL   =< 100   Triglycerides  =< 150       Glucose and Hemoglobin A1C  Lab Results   Component Value Date    GLU 85 2020    A1C 5.5 2020     (Normal Fasting Glucose < 100mg/dl )    Nurse Review  Risk factor information and results reviewed with Nurse: Yes    Recommended Follow Up:  Consult your physician regarding:: Final Heart Scan Report;Discuss potential for Incidental Finding      Recommendations for Change:  Nutrition Changes: Low Saturated Fat;Low Fat Dairy;Increase Fiber    Cholesterol Modification (goal of therapy depends upon your risk): Increase HDL (Healthy/Good) Normal >45 Men >55 Women;Decrease LDL (Lousy/Bad) Ideal <100;Decrease Triglycerides (Ugly) Normal <150    Exercise: No Change Needed    Smoking Cessation: No Change Needed    Weight Management: Decrease Current Weight    Stress Management: Adopt Stress Management Techniques    Repeat Heart Scan: 5 years if  Calcium Score is 0.0;Discuss with your Physician              [place] Recommended Resources:  Recommended Resources: PV Screening;Upcoming Classes, Medical Services and Health Library [email or web address]  Recommended PV Screening: Abdomen;Carotids         Peggy PARRY RN        Please Contact the Nurse Heart Line with any Questions or Concerns [phone].

## 2024-01-16 ENCOUNTER — TELEPHONE (OUTPATIENT)
Dept: FAMILY MEDICINE CLINIC | Facility: CLINIC | Age: 59
End: 2024-01-16

## 2024-01-16 NOTE — TELEPHONE ENCOUNTER
RN s/w [facility] CT technician regarding patient's CT scan recommendations follow up.     RN transferred to Dr. Michelle (interpreting radiologist voicemail) and left voicemail for MD to call Dr. Juares office to discuss patient's CT Calcium score.

## 2024-05-13 ENCOUNTER — PATIENT OUTREACH (OUTPATIENT)
Dept: CASE MANAGEMENT | Age: 59
End: 2024-05-13

## 2024-05-13 ENCOUNTER — TELEPHONE (OUTPATIENT)
Dept: FAMILY MEDICINE CLINIC | Facility: CLINIC | Age: 59
End: 2024-05-13

## 2024-05-13 NOTE — PROCEDURES
The office order for PCP removal request is Approved and finalized on May 13, 2024.    Thanks,  [facility] Team

## 2025-06-12 ENCOUNTER — APPOINTMENT (OUTPATIENT)
Dept: GENERAL RADIOLOGY | Age: 60
End: 2025-06-12
Attending: NURSE PRACTITIONER
Payer: COMMERCIAL

## 2025-06-12 ENCOUNTER — HOSPITAL ENCOUNTER (OUTPATIENT)
Age: 60
Discharge: HOME | End: 2025-06-12
Payer: COMMERCIAL

## 2025-06-12 VITALS
RESPIRATION RATE: 16 BRPM | DIASTOLIC BLOOD PRESSURE: 78 MMHG | HEART RATE: 85 BPM | TEMPERATURE: 99 F | SYSTOLIC BLOOD PRESSURE: 144 MMHG | OXYGEN SATURATION: 98 %

## 2025-06-12 DIAGNOSIS — S20.212A CONTUSION OF RIB ON LEFT SIDE, INITIAL ENCOUNTER: Primary | ICD-10-CM

## 2025-06-12 PROCEDURE — 99203 OFFICE O/P NEW LOW 30 MIN: CPT | Performed by: NURSE PRACTITIONER

## 2025-06-12 PROCEDURE — 71101 X-RAY EXAM UNILAT RIBS/CHEST: CPT | Performed by: NURSE PRACTITIONER

## 2025-06-12 NOTE — ED INITIAL ASSESSMENT (HPI)
LEFT RIB PAIN STARTED JUNE 1ST THAT NOT GETTING BETTER. PER PT. SHE WAS TRYING TO CLOSE A SUITCASE AND NOTED PAIN. NO SOB

## 2025-06-12 NOTE — ED PROVIDER NOTES
Patient Seen in: [facility]        History  Chief Complaint   Patient presents with    Pain     Stated Complaint: Rib Problem    Subjective:   Patient is a 60-year-old female who presents today for left anterior rib pain ongoing for 12 days.  Reports it started after she bent over to zip a suitcase, and felt a crack.  She has had no chest pain or shortness of breath.  No abdominal pain.        Objective:     Past Medical History:    Essential hypertension    Migraines    Osteoporosis              History reviewed. No pertinent surgical history.             Social History     Socioeconomic History    Marital status:    Tobacco Use    Smoking status: Never    Smokeless tobacco: Never   Vaping Use    Vaping status: Never Used   Substance and Sexual Activity    Alcohol use: Never    Drug use: Never     Social Drivers of Health     Food Insecurity: No Food Insecurity (4/10/2025)    Received from [facility]    Hunger Vital Sign     Worried About Running Out of Food in the Last Year: Never true     Ran Out of Food in the Last Year: Never true   Transportation Needs: No Transportation Needs (4/10/2025)    Received from [facility]    PRAPARE - Transportation     Lack of Transportation (Medical): No     Lack of Transportation (Non-Medical): No   Housing Stability: Low Risk  (4/10/2025)    Received from [facility]    Housing Stability Vital Sign     Unable to Pay for Housing in the Last Year: No     Number of Times Moved in the Last Year: 0     Homeless in the Last Year: No              Review of Systems   All other systems reviewed and are negative.      Positive for stated complaint: Rib Problem  Other systems are as noted in HPI.  Constitutional and vital signs reviewed.      All other systems reviewed and negative except as noted above.                  Physical Exam    ED Triage Vitals [06/12/25 1149]   /78   Pulse 85   Resp 16    Temp 98.6 °F (37 °C)   Temp src Oral   SpO2 98 %   O2 Device None (Room air)       Current Vitals:   Vital Signs  BP: 144/78  Pulse: 85  Resp: 16  Temp: 98.6 °F (37 °C)  Temp src: Oral    Oxygen Therapy  SpO2: 98 %  O2 Device: None (Room air)            Physical Exam  Constitutional:       Appearance: Normal appearance.   Cardiovascular:      Rate and Rhythm: Normal rate and regular rhythm.      Pulses: Normal pulses.      Heart sounds: Normal heart sounds.   Pulmonary:      Effort: Pulmonary effort is normal.      Breath sounds: Normal breath sounds.   Chest:      Chest wall: Tenderness present.       Abdominal:      General: Abdomen is flat. Bowel sounds are normal.      Palpations: Abdomen is soft.      Tenderness: There is no abdominal tenderness.   Neurological:      Mental Status: She is alert.         ED Course  Labs Reviewed - No data to display       MDM     Medical Decision Making  Differentials considered: Rib fracture versus rib contusion versus intra-abdominal injury versus other    HPI and exam consistent with a left rib contusion.  There is no rib fracture noted on left rib x-ray.  There is no abdominal tenderness to suggest intra-abdominal injury.  Discussed supportive care.  Discussed ER precautions.  Advised follow-up with primary care provider in 1 week if no improvement.  Patient verbalized understanding and agreeable to plan of care.    Amount and/or Complexity of Data Reviewed  Radiology: ordered and independent interpretation performed. Decision-making details documented in ED Course.     Details: I personally reviewed left rib x-ray: No rib fracture    Risk  OTC drugs.        Disposition and Plan     Clinical Impression:  1. Contusion of rib on left side, initial encounter         Disposition:  Discharge  6/12/2025  1:23 pm    Follow-up:  No follow-up provider specified.        Medications Prescribed:  Discharge Medication List as of 6/12/2025  1:25 PM                Supplementary  Documentation:

## 2025-06-12 NOTE — DISCHARGE INSTRUCTIONS
Tylenol 650 mg every 6 hours as needed  Alternate between heat and ice over clothing Temazin time 3 times per day  ER for any worsening symptoms  Follow-up with primary care provider in 1 to 2 weeks if no improvement

## 2025-08-19 ENCOUNTER — TELEPHONE (OUTPATIENT)
Dept: ORTHOPEDICS CLINIC | Facility: CLINIC | Age: 60
End: 2025-08-19

## 2025-08-19 ENCOUNTER — APPOINTMENT (OUTPATIENT)
Dept: GENERAL RADIOLOGY | Age: 60
End: 2025-08-19
Attending: NURSE PRACTITIONER

## 2025-08-19 ENCOUNTER — HOSPITAL ENCOUNTER (OUTPATIENT)
Age: 60
Discharge: HOME | End: 2025-08-19

## 2025-08-19 VITALS
SYSTOLIC BLOOD PRESSURE: 148 MMHG | TEMPERATURE: 98 F | HEART RATE: 71 BPM | OXYGEN SATURATION: 100 % | RESPIRATION RATE: 18 BRPM | DIASTOLIC BLOOD PRESSURE: 92 MMHG

## 2025-08-19 DIAGNOSIS — S62.355A NONDISPLACED FRACTURE OF SHAFT OF FOURTH METACARPAL BONE, LEFT HAND, INITIAL ENCOUNTER FOR CLOSED FRACTURE: Primary | ICD-10-CM

## 2025-08-19 PROCEDURE — A6448 LT COMPRES BAND <3"/YD: HCPCS | Performed by: NURSE PRACTITIONER

## 2025-08-19 PROCEDURE — 29125 APPL SHORT ARM SPLINT STATIC: CPT | Performed by: NURSE PRACTITIONER

## 2025-08-19 PROCEDURE — 73130 X-RAY EXAM OF HAND: CPT | Performed by: NURSE PRACTITIONER

## 2025-08-19 RX ORDER — LOSARTAN POTASSIUM 50 MG/1
50 TABLET ORAL DAILY
COMMUNITY
Start: 2025-08-01

## 2025-08-20 ENCOUNTER — OFFICE VISIT (OUTPATIENT)
Dept: ORTHOPEDICS CLINIC | Facility: CLINIC | Age: 60
End: 2025-08-20

## 2025-08-20 VITALS — HEIGHT: 64 IN | WEIGHT: 170 LBS | BODY MASS INDEX: 29.02 KG/M2

## 2025-08-20 DIAGNOSIS — S62.355A CLOSED NONDISPLACED FRACTURE OF SHAFT OF FOURTH METACARPAL BONE OF LEFT HAND, INITIAL ENCOUNTER: Primary | ICD-10-CM

## 2025-08-20 PROCEDURE — 99203 OFFICE O/P NEW LOW 30 MIN: CPT | Performed by: PHYSICIAN ASSISTANT

## 2025-08-20 PROCEDURE — 3008F BODY MASS INDEX DOCD: CPT | Performed by: PHYSICIAN ASSISTANT

## 2025-08-21 ENCOUNTER — PATIENT MESSAGE (OUTPATIENT)
Dept: ORTHOPEDICS CLINIC | Facility: CLINIC | Age: 60
End: 2025-08-21

## 2025-08-28 ENCOUNTER — PATIENT MESSAGE (OUTPATIENT)
Dept: ORTHOPEDICS CLINIC | Facility: CLINIC | Age: 60
End: 2025-08-28

## (undated) NOTE — ED AVS SNAPSHOT
[facility] in [place] [address]    Phone:  [phone]    Fax:  [phone]           Chris Roland   MRN: [id number]    Department:  [facility]   Date of Visit:  6/9/ deductible, co-payment, or co-insurance and for other services not covered under your health insurance plan. Please contact your insurance company and physician's office to determine coverage and benefits available for follow-up care and referrals.      It continue to take your medications as instructed by your Primary Care doctor until you can check with your doctor. Please bring the medication list to your next doctor's appointment.     Any imaging studies and labs completed today can be reviewed in your M can help with your Affordable Care Act coverage, as well as all types of Medicaid plans. To get signed up and covered, please call [phone] and ask to get set up for an insurance coverage that is in-network with [facility]

## (undated) NOTE — LETTER
[facility], [place]  [address]  [address] [facility] [address]  [address],     The sleep study shows that you have some apnea during sleep however you don't have enough to qualify

## (undated) NOTE — ED AVS SNAPSHOT
[facility] in [place] [address]    Phone:  [phone]    Fax:  [phone]           Shayne Urena   MRN: [id number]    Department:  [facility] in [facility]   Date of Visit:  5/30 and physician's office to determine coverage and benefits available for follow-up care and referrals. It is our goal to assure that you are completely satisfied with every aspect of your visit today.   In an effort to constantly improve our service to y Any imaging studies and labs completed today can be reviewed in your MyChart account. You may have had testing done that requires us to contact you. Please make sure we have your correct phone number on file.      OUR CURRENT HOURS OF OPERATION:  MONDAY T and ask to get set up for an insurance coverage that is in-network with "[employer] [facility]. [employer]     Sign up for [employer], your secure online medical record.   [employer] will allow you to access patient instructions from your recent visit,  view